# Patient Record
Sex: FEMALE | Race: WHITE
[De-identification: names, ages, dates, MRNs, and addresses within clinical notes are randomized per-mention and may not be internally consistent; named-entity substitution may affect disease eponyms.]

---

## 2019-10-14 ENCOUNTER — HOSPITAL ENCOUNTER (INPATIENT)
Dept: HOSPITAL 92 - L&D | Age: 34
LOS: 3 days | Discharge: HOME | End: 2019-10-17
Attending: OBSTETRICS & GYNECOLOGY | Admitting: OBSTETRICS & GYNECOLOGY
Payer: COMMERCIAL

## 2019-10-14 VITALS — BODY MASS INDEX: 51.7 KG/M2

## 2019-10-14 DIAGNOSIS — Z3A.37: ICD-10-CM

## 2019-10-14 PROCEDURE — 86850 RBC ANTIBODY SCREEN: CPT

## 2019-10-14 PROCEDURE — 51702 INSERT TEMP BLADDER CATH: CPT

## 2019-10-14 PROCEDURE — 85027 COMPLETE CBC AUTOMATED: CPT

## 2019-10-14 PROCEDURE — 87340 HEPATITIS B SURFACE AG IA: CPT

## 2019-10-14 PROCEDURE — 82805 BLOOD GASES W/O2 SATURATION: CPT

## 2019-10-14 PROCEDURE — S0020 INJECTION, BUPIVICAINE HYDRO: HCPCS

## 2019-10-14 PROCEDURE — 86780 TREPONEMA PALLIDUM: CPT

## 2019-10-14 PROCEDURE — 86901 BLOOD TYPING SEROLOGIC RH(D): CPT

## 2019-10-14 PROCEDURE — 86900 BLOOD TYPING SEROLOGIC ABO: CPT

## 2019-10-14 PROCEDURE — 36415 COLL VENOUS BLD VENIPUNCTURE: CPT

## 2019-10-15 LAB
ANALYZER IN CARDIO: (no result)
ANALYZER IN CARDIO: (no result)
BASE EXCESS STD BLDA CALC-SCNC: -8.6 MEQ/L
BASE EXCESS STD BLDV CALC-SCNC: -5.8 MEQ/L
HBSAG INDEX: 0.1 S/CO (ref 0–0.99)
HCO3 BLDA-SCNC: 21.7 MEQ/L (ref 22–28)
HCO3 BLDV-SCNC: 20 MEQ/L (ref 22–28)
HGB BLD-MCNC: 12.5 G/DL (ref 12–16)
MCH RBC QN AUTO: 31.1 PG (ref 27–31)
MCV RBC AUTO: 87.1 FL (ref 78–98)
PH BLDV: 7.31 [PH] (ref 7.32–7.43)
PLATELET # BLD AUTO: 321 THOU/UL (ref 130–400)
RBC # BLD AUTO: 4.03 MILL/UL (ref 4.2–5.4)
SYPHILIS ANTIBODY INDEX: 0.05 S/CO
WBC # BLD AUTO: 12 THOU/UL (ref 4.8–10.8)

## 2019-10-15 PROCEDURE — 0KQM0ZZ REPAIR PERINEUM MUSCLE, OPEN APPROACH: ICD-10-PCS | Performed by: OBSTETRICS & GYNECOLOGY

## 2019-10-15 PROCEDURE — 3E033VJ INTRODUCTION OF OTHER HORMONE INTO PERIPHERAL VEIN, PERCUTANEOUS APPROACH: ICD-10-PCS | Performed by: OBSTETRICS & GYNECOLOGY

## 2019-10-15 NOTE — [email or web address].OPDEL
OB Operative/Delivery Note


Delivery Dr/Surgeon: Adriana


Assist: Dominick


Pre-Delivery Diagnosis: medically indicated induction


Procedure/Post Delivery Dx: other (Breech Vaginal Delivery)


Weeks gestation: 37


Anesthesia: epidural





- Findings


  ** A


Sex: male


Weight: 6 lb 1 oz


Apgar - 1 min: 6


Apgar - 5 min: 8





- Additional Findings/Plan


Placenta delivered: spontaneous


Repaired Obstetrical Laceration: 2nd degree


Estimated blood loss: 100


Compilations/Other Findings: 





Upon arrival to OR, fetus noted to be +4 station and delivery imminent.  

Decision was made to proceed with assisted breech vaginal delivery.  With 

maternal expulsive efforts, breech was delivered, followed by the lower 

extremities after medial sweep of each leg.  Maternal expulsive efforts 

continued until the fetal axillae were visible. Dr. Tan assisted in 

supporting the body and the infant was rotated to 90 degrees. The right arm was 

swept out of the vagina without difficulty.  The infant was then rotated 180 

degrees and the left arm was swept out of the vagina without difficulty.  The 

infant was rotated 90 degrees with the back anterior and the head was flexed 

using the Mauriceau maneuver and delivered.  The infant spontaneously cried and 

the nose and mouth were suctioned with bulb suction.  The cord was clamped and 

cut and the infant was handed to the awaiting NICU team.  Cord gases were sent.

  Mom and baby doing well.


Post delivery plan: routine recovery

## 2019-10-15 NOTE — PDOC.EVN
Event Note





- Event Note


Event Note: 





To room for evaluation of fetal heart rate deceleration.  Patient noted to be 10

/100/+2 in incomplete breech presentation.  Patient counseled on proceeding 

with breech vaginal delivery vs emergent  section.  Upon arrival to OR, 

fetus at +4 station and the decision was made to proceed with breech vaginal 

delivery.  See delivery note for full details.

## 2019-10-16 LAB
HGB BLD-MCNC: 11.8 G/DL (ref 12–16)
MCH RBC QN AUTO: 30.6 PG (ref 27–31)
MCV RBC AUTO: 88.2 FL (ref 78–98)
PLATELET # BLD AUTO: 296 THOU/UL (ref 130–400)
RBC # BLD AUTO: 3.85 MILL/UL (ref 4.2–5.4)
WBC # BLD AUTO: 15.2 THOU/UL (ref 4.8–10.8)

## 2019-10-16 RX ADMIN — DOCUSATE CALCIUM SCH MG: 240 CAPSULE, LIQUID FILLED ORAL at 09:49

## 2019-10-16 RX ADMIN — DOCUSATE CALCIUM SCH MG: 240 CAPSULE, LIQUID FILLED ORAL at 21:25

## 2019-10-17 VITALS — SYSTOLIC BLOOD PRESSURE: 97 MMHG | DIASTOLIC BLOOD PRESSURE: 57 MMHG | TEMPERATURE: 98.7 F

## 2019-10-17 RX ADMIN — HYDROCODONE BITARTRATE AND ACETAMINOPHEN PRN TAB: 5; 325 TABLET ORAL at 11:27

## 2019-10-17 RX ADMIN — HYDROCODONE BITARTRATE AND ACETAMINOPHEN PRN TAB: 5; 325 TABLET ORAL at 05:47

## 2019-10-17 RX ADMIN — DOCUSATE CALCIUM SCH MG: 240 CAPSULE, LIQUID FILLED ORAL at 08:29

## 2020-07-07 ENCOUNTER — HOSPITAL ENCOUNTER (EMERGENCY)
Dept: HOSPITAL 18 - NAV ERS | Age: 35
Discharge: HOME | End: 2020-07-07
Payer: SELF-PAY

## 2020-07-07 DIAGNOSIS — E66.9: ICD-10-CM

## 2020-07-07 DIAGNOSIS — Z79.899: ICD-10-CM

## 2020-07-07 DIAGNOSIS — I10: ICD-10-CM

## 2020-07-07 DIAGNOSIS — F41.9: ICD-10-CM

## 2020-07-07 DIAGNOSIS — S61.210A: Primary | ICD-10-CM
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
## 2020-07-07 PROCEDURE — 12001 RPR S/N/AX/GEN/TRNK 2.5CM/<: CPT
